# Patient Record
(demographics unavailable — no encounter records)

---

## 2025-02-03 NOTE — ASSESSMENT
[FreeTextEntry1] : Ayse is a 9 year 5 month old girl with a delayed bone age and a height which is currently within the range expected for patient's mid-parental height. We do not have access to a growth chart to evaluate if patient's previous growth velocity. We asked family to email a bone age XR image and also email growth chart from pediatrician office to our office. We discussed with Ayse's mother the important factors necessary for normal growth.  Screening labs for growth are normal.  Given mother's concerns, we will continue to monitor Ayse's growth and plan to see her again in 6 months.  The condition, natural history, and prognosis were explained to the patient and family.  All questions answered. Family expressed understanding and agreement with the above.  Kari Narvaez PA-C Pediatric Endocrinology

## 2025-02-03 NOTE — CONSULT LETTER
[Dear  ___] : Dear  [unfilled], [Consult Letter:] : I had the pleasure of evaluating your patient, [unfilled]. [Consult Closing:] : Thank you very much for allowing me to participate in the care of this patient.  If you have any questions, please do not hesitate to contact me. [FreeTextEntry2] : Shaka Gonzales MD

## 2025-02-03 NOTE — END OF VISIT
[FreeTextEntry3] : I saw Ayse with Kari Narvaez PA-C.  I agree with history, exam findings, and assessment.

## 2025-02-03 NOTE — REASON FOR VISIT
[Consultation] : a consultation visit [Mother] : mother [Language Line ] : provided by [employer]   [Interpreters_IDNumber] : [id number] [FreeTextEntry3] : Mandarin

## 2025-02-03 NOTE — HISTORY OF PRESENT ILLNESS
[Premenarchal] : premenarchal [Constipation] : constipation [Headaches] : no headaches [Visual Symptoms] : no ~T visual symptoms [Polyuria] : no polyuria [Polydipsia] : no polydipsia [Cold Intolerance] : no cold intolerance [Muscle Weakness] : no muscle weakness [Change in School Performance] : no change in school performance [Fatigue] : no fatigue [Weakness] : no weakness [Abdominal Pain] : no abdominal pain [Weight Loss] : no weight loss [FreeTextEntry2] : Ayse is a 9 year 5 month old girl, here with her mother for consultation of growth concerns.  Ayse's mother is concerned about patient's growth, stating that she does not feel she has grown much over the last year. She also reports concern regarding weight gain, reporting low appetite, and that patient is a picky eater. We do not have access to previous growth charts today. Ayse's pediatrician ordered labs and bone age Xray, which patient completed prior to today's visit. Her bone age is reported as 6 year 10 months at chronologic age 9 years 1 month. Labs were obtained on 10/2/24, included normal CMP, normal CBC, normal Urinalysis, TSH 0.751 uIU/ml, FT4 1.51 ng/dl, ESR 33 mm/h (<31), IGF-1 166 ng/ml, IGFBP-3 5700 ng/ml ([id number]), Negative celiac screen.

## 2025-02-03 NOTE — PHYSICAL EXAM
[Healthy Appearing] : healthy appearing [Well Nourished] : well nourished [Interactive] : interactive [Normal Appearance] : normal appearance [Well formed] : well formed [Normally Set] : normally set [Normal S1 and S2] : normal S1 and S2 [Clear to Ausculation Bilaterally] : clear to auscultation bilaterally [Abdomen Soft] : soft [Abdomen Tenderness] : non-tender [] : no hepatosplenomegaly [1] : was  stage 1 [ Stage ___] : the  stage for breast development was [unfilled] [Normal] : normal  [Murmur] : no murmurs

## 2025-02-03 NOTE — HISTORY OF PRESENT ILLNESS
[Premenarchal] : premenarchal [Constipation] : constipation [Headaches] : no headaches [Visual Symptoms] : no ~T visual symptoms [Polyuria] : no polyuria [Polydipsia] : no polydipsia [Cold Intolerance] : no cold intolerance [Muscle Weakness] : no muscle weakness [Change in School Performance] : no change in school performance [Fatigue] : no fatigue [Weakness] : no weakness [Abdominal Pain] : no abdominal pain [Weight Loss] : no weight loss [FreeTextEntry2] : Ayse is a 9 year 5 month old girl, here with her mother for consultation of growth concerns.  Ayse's mother is concerned about patient's growth, stating that she does not feel she has grown much over the last year. She also reports concern regarding weight gain, reporting low appetite, and that patient is a picky eater. We do not have access to previous growth charts today. Ayse's pediatrician ordered labs and bone age Xray, which patient completed prior to today's visit. Her bone age is reported as 6 year 10 months at chronologic age 9 years 1 month. Labs were obtained on 10/2/24, included normal CMP, normal CBC, normal Urinalysis, TSH 0.751 uIU/ml, FT4 1.51 ng/dl, ESR 33 mm/h (<31), IGF-1 166 ng/ml, IGFBP-3 5700 ng/ml ([id number]), Negative celiac screen.